# Patient Record
(demographics unavailable — no encounter records)

---

## 2024-10-15 NOTE — PLAN
[FreeTextEntry1] : #Injection - UCg (-) at this time. - Gardasil #2 administered to L deltoid without issues.  LOT#[id number],  EXP: 10/8.2026 - Patient to RTO in 4 months for final injection, or sooner as needed. - All questions and concerns addressed to patient satisfaction.

## 2024-10-15 NOTE — HISTORY OF PRESENT ILLNESS
[FreeTextEntry1] : Patient is a 26 year  old, P0,  presenting for Gardasil injection #2.    1st dose was given in office on 8/15/24  without issues. Patient states that she feels well overall and has no physical complaints at this time.   LMP: currently menstruating, started 10/11   SexualHx: most recent encounter was 1 month ago   Concerns: none at this time.

## 2025-02-13 NOTE — PLAN
[FreeTextEntry1] : #Injection - UCg (-) at this time. - Gardasil #2 administered to L deltoid without issues.  LOT#,[id number]  EXP: 4/22/2026 - Patient to RTO for annual or sooner as needed. - All questions and concerns addressed to patient satisfaction.

## 2025-02-13 NOTE — HISTORY OF PRESENT ILLNESS
[FreeTextEntry1] : Patient is a 26 year  old, P0,  presenting for final Gardasil injection.    Last dose was given in office on 10/15/24 without issues. Patient states that she feels well overall and has no physical complaints at this time.   LMP: 2/3/25   SexualHx: no concerns.